# Patient Record
Sex: FEMALE | Race: WHITE | Employment: FULL TIME | URBAN - METROPOLITAN AREA
[De-identification: names, ages, dates, MRNs, and addresses within clinical notes are randomized per-mention and may not be internally consistent; named-entity substitution may affect disease eponyms.]

---

## 2017-04-12 DIAGNOSIS — F41.9 ANXIETY: ICD-10-CM

## 2017-04-12 RX ORDER — LORAZEPAM 0.5 MG/1
0.5 TABLET ORAL
Qty: 30 TAB | Refills: 0 | Status: SHIPPED | OUTPATIENT
Start: 2017-04-12

## 2017-04-12 NOTE — TELEPHONE ENCOUNTER
Requested Prescriptions     Pending Prescriptions Disp Refills    LORazepam (ATIVAN) 0.5 mg tablet 30 Tab 0     Sig: Take 1 Tab by mouth every eight (8) hours as needed for Anxiety.  Max Daily Amount: 1.5 mg.

## 2017-05-18 ENCOUNTER — HOSPITAL ENCOUNTER (OUTPATIENT)
Dept: LAB | Age: 32
Discharge: HOME | End: 2017-05-18
Payer: SELF-PAY

## 2017-05-18 ENCOUNTER — OFFICE VISIT (OUTPATIENT)
Dept: FAMILY MEDICINE CLINIC | Age: 32
End: 2017-05-18

## 2017-05-18 VITALS
BODY MASS INDEX: 32.44 KG/M2 | TEMPERATURE: 99.9 F | HEART RATE: 102 BPM | WEIGHT: 190 LBS | HEIGHT: 64 IN | DIASTOLIC BLOOD PRESSURE: 86 MMHG | SYSTOLIC BLOOD PRESSURE: 129 MMHG | OXYGEN SATURATION: 100 % | RESPIRATION RATE: 16 BRPM

## 2017-05-18 DIAGNOSIS — R10.9 FLANK PAIN: ICD-10-CM

## 2017-05-18 DIAGNOSIS — N20.0 KIDNEY STONES: ICD-10-CM

## 2017-05-18 DIAGNOSIS — R10.9 RIGHT FLANK PAIN: Primary | ICD-10-CM

## 2017-05-18 DIAGNOSIS — R10.9 RIGHT FLANK PAIN: ICD-10-CM

## 2017-05-18 DIAGNOSIS — N39.0 URINARY TRACT INFECTION WITHOUT HEMATURIA, SITE UNSPECIFIED: ICD-10-CM

## 2017-05-18 LAB
BILIRUB UR QL STRIP: NEGATIVE
GLUCOSE UR-MCNC: NEGATIVE MG/DL
KETONES P FAST UR STRIP-MCNC: NEGATIVE MG/DL
PH UR STRIP: 6 [PH] (ref 4.6–8)
PROT UR QL STRIP: NEGATIVE MG/DL
SP GR UR STRIP: 1.02 (ref 1–1.03)
UA UROBILINOGEN AMB POC: NORMAL (ref 0.2–1)
URINALYSIS CLARITY POC: NORMAL
URINALYSIS COLOR POC: YELLOW
URINE BLOOD POC: NORMAL
URINE LEUKOCYTES POC: NORMAL
URINE NITRITES POC: NEGATIVE

## 2017-05-18 PROCEDURE — 87086 URINE CULTURE/COLONY COUNT: CPT | Performed by: FAMILY MEDICINE

## 2017-05-18 RX ORDER — HYDROCODONE BITARTRATE AND ACETAMINOPHEN 10; 325 MG/1; MG/1
1 TABLET ORAL
Qty: 45 TAB | Refills: 0 | Status: SHIPPED | OUTPATIENT
Start: 2017-05-18 | End: 2017-06-02

## 2017-05-18 RX ORDER — PHENAZOPYRIDINE HYDROCHLORIDE 100 MG/1
100 TABLET, FILM COATED ORAL
Qty: 9 TAB | Refills: 0 | Status: SHIPPED | OUTPATIENT
Start: 2017-05-18 | End: 2017-05-21

## 2017-05-18 RX ORDER — NITROFURANTOIN (MACROCRYSTALS) 100 MG/1
100 CAPSULE ORAL
Qty: 10 CAP | Refills: 0 | Status: SHIPPED | OUTPATIENT
Start: 2017-05-18 | End: 2017-05-28

## 2017-05-18 NOTE — PROGRESS NOTES
HISTORY OF PRESENT ILLNESS  Ahmet Mcpherson is a 27 y.o. female. HPI Comments: Patient is here as she has  been having ongoing right sided flank pain. She has a UTI today and I will prescribe antibiotics and pain relief. I have advised her as she has had kidney stones 5 years back and I will order ct scan of abdomen/pelvis. Flank Pain    The history is provided by the patient. This is a recurrent problem. The problem has been gradually worsening. The problem occurs constantly. The pain is associated with twisting. The pain is present in the right side. The quality of the pain is described as stabbing and burning. The pain does not radiate. The pain is at a severity of 5/10. The pain is moderate. The pain is worse during the day. Associated symptoms include weight loss, abdominal pain, dysuria and pelvic pain. Pertinent negatives include no chest pain, no fever, no numbness, no headaches, no perianal numbness, no bladder incontinence, no leg pain, no paresis, no tingling and no weakness. She has tried nothing for the symptoms. Review of Systems   Constitutional: Positive for weight loss. Negative for chills and fever. HENT: Negative for congestion, ear pain, hearing loss and sore throat. Eyes: Negative for blurred vision, double vision, pain and discharge. Respiratory: Negative for cough, sputum production, shortness of breath and wheezing. Cardiovascular: Negative for chest pain, palpitations and leg swelling. Gastrointestinal: Positive for abdominal pain. Negative for blood in stool, constipation, nausea and vomiting. Genitourinary: Positive for dysuria, flank pain and pelvic pain. Negative for bladder incontinence. Musculoskeletal: Negative for joint pain and myalgias. Neurological: Positive for tremors. Negative for tingling, focal weakness, weakness, numbness and headaches. Psychiatric/Behavioral: The patient is not nervous/anxious.       Visit Vitals    /86 (BP 1 Location: Left arm, BP Patient Position: Sitting)    Pulse (!) 102    Temp 99.9 °F (37.7 °C) (Oral)    Resp 16    Ht 5' 4\" (1.626 m)    Wt 190 lb (86.2 kg)    SpO2 100%    BMI 32.61 kg/m2       Physical Exam   Constitutional: She is oriented to person, place, and time. She appears well-developed and well-nourished. No distress. HENT:   Head: Normocephalic and atraumatic. Right Ear: External ear normal.   Left Ear: External ear normal.   Mouth/Throat: Oropharynx is clear and moist.   Eyes: EOM are normal. Pupils are equal, round, and reactive to light. No scleral icterus. Neck: Normal range of motion. No thyromegaly present. Cardiovascular: Normal rate, regular rhythm and normal heart sounds. Pulmonary/Chest: Effort normal and breath sounds normal. No respiratory distress. She has no wheezes. Abdominal: Soft. Bowel sounds are normal. She exhibits distension. There is tenderness in the suprapubic area. There is CVA tenderness. Lymphadenopathy:     She has no cervical adenopathy. Neurological: She is alert and oriented to person, place, and time. Psychiatric: She has a normal mood and affect. ASSESSMENT and PLAN  Right sided flank pain/UTI:  1) Urine dipstick to be ordered in office today, results to be discussed with patient in office. 2) Please finish antibiotic course , side effects of medication explained to patient, patient verbalizes understanding. 3) Urine culture ordered. 4) Drink plenty of water, can also drink cranberry juice. 5) Avoid bladder irritants such as coffee, alcohol, soft drinks, citrus juices. 6) Can apply a heating pad to lower abdomen to minimize bladder pressure and discomfort. 7) Wipe from front to back. 8) Empty your bladder every half hour to one hour and after intercourse. 9) Avoid irritating feminine products at this time such as douches and powders.   CT scan of the abdomen and pelvis

## 2017-05-18 NOTE — PROGRESS NOTES
Patient is here for flank pain. She said she has had it before. But now its morning burning. 1. Have you been to the ER, urgent care clinic since your last visit? Hospitalized since your last visit?no    2. Have you seen or consulted any other health care providers outside of the [address] [place] [address] since your last visit? Include any pap smears or colon screening.  no

## 2017-05-20 LAB
BACTERIA SPEC CULT: NORMAL
SERVICE CMNT-IMP: NORMAL

## 2022-03-20 PROBLEM — N20.0 KIDNEY STONES: Status: ACTIVE | Noted: 2017-05-18

## 2023-01-31 RX ORDER — LORAZEPAM 0.5 MG/1
0.5 TABLET ORAL EVERY 8 HOURS PRN
COMMUNITY
Start: 2017-04-12